# Patient Record
Sex: MALE | Race: WHITE | Employment: OTHER | URBAN - METROPOLITAN AREA
[De-identification: names, ages, dates, MRNs, and addresses within clinical notes are randomized per-mention and may not be internally consistent; named-entity substitution may affect disease eponyms.]

---

## 2017-12-07 NOTE — TELEPHONE ENCOUNTER
Date of last visit:  12/12/2016  Date of next visit:  Visit date not found    Requested Prescriptions     Pending Prescriptions Disp Refills    metoprolol tartrate (LOPRESSOR) 50 MG tablet [Pharmacy Med Name: METOPROLOL TARTRATE TABS 50MG] 135 tablet 1     Sig: TAKE ONE-HALF (1/2) TABLET THREE TIMES A DAY

## 2017-12-08 RX ORDER — METOPROLOL TARTRATE 50 MG/1
TABLET, FILM COATED ORAL
Qty: 135 TABLET | Refills: 0 | Status: SHIPPED | OUTPATIENT
Start: 2017-12-08 | End: 2018-06-13 | Stop reason: SDUPTHER

## 2018-05-05 LAB
CHOLESTEROL, TOTAL: NORMAL MG/DL
CHOLESTEROL/HDL RATIO: NORMAL
HDLC SERPL-MCNC: NORMAL MG/DL (ref 35–70)
LDL CHOLESTEROL CALCULATED: 123 MG/DL (ref 0–160)
TRIGL SERPL-MCNC: NORMAL MG/DL
VLDLC SERPL CALC-MCNC: NORMAL MG/DL

## 2018-06-05 RX ORDER — METOPROLOL TARTRATE 50 MG/1
50 TABLET, FILM COATED ORAL 2 TIMES DAILY
Qty: 135 TABLET | Refills: 0 | Status: CANCELLED | OUTPATIENT
Start: 2018-06-05

## 2018-06-13 ENCOUNTER — OFFICE VISIT (OUTPATIENT)
Dept: FAMILY MEDICINE CLINIC | Age: 73
End: 2018-06-13

## 2018-06-13 VITALS
BODY MASS INDEX: 23.66 KG/M2 | SYSTOLIC BLOOD PRESSURE: 126 MMHG | RESPIRATION RATE: 14 BRPM | WEIGHT: 184.4 LBS | DIASTOLIC BLOOD PRESSURE: 68 MMHG | HEART RATE: 60 BPM | HEIGHT: 74 IN

## 2018-06-13 DIAGNOSIS — H40.9 GLAUCOMA OF BOTH EYES, UNSPECIFIED GLAUCOMA TYPE: ICD-10-CM

## 2018-06-13 DIAGNOSIS — I25.10 ASCVD (ARTERIOSCLEROTIC CARDIOVASCULAR DISEASE): Primary | ICD-10-CM

## 2018-06-13 PROCEDURE — 99213 OFFICE O/P EST LOW 20 MIN: CPT | Performed by: FAMILY MEDICINE

## 2018-06-13 RX ORDER — METOPROLOL TARTRATE 50 MG/1
50 TABLET, FILM COATED ORAL 3 TIMES DAILY
Qty: 540 TABLET | Refills: 1 | Status: SHIPPED | OUTPATIENT
Start: 2018-06-13 | End: 2019-06-13 | Stop reason: SDUPTHER

## 2018-06-13 ASSESSMENT — PATIENT HEALTH QUESTIONNAIRE - PHQ9
SUM OF ALL RESPONSES TO PHQ QUESTIONS 1-9: 0
2. FEELING DOWN, DEPRESSED OR HOPELESS: 0
SUM OF ALL RESPONSES TO PHQ9 QUESTIONS 1 & 2: 0
1. LITTLE INTEREST OR PLEASURE IN DOING THINGS: 0

## 2018-06-13 ASSESSMENT — ENCOUNTER SYMPTOMS
CHEST TIGHTNESS: 0
EYES NEGATIVE: 1
SHORTNESS OF BREATH: 0
NAUSEA: 0
COUGH: 0
BLOOD IN STOOL: 0
VOMITING: 0
ABDOMINAL PAIN: 0
DIARRHEA: 0
CONSTIPATION: 0

## 2019-06-13 NOTE — TELEPHONE ENCOUNTER
Date of last visit:  6/13/2018  Date of next visit:  Visit date not found    Requested Prescriptions     Pending Prescriptions Disp Refills    metoprolol tartrate (LOPRESSOR) 50 MG tablet [Pharmacy Med Name: METOPROLOL TARTRATE TABS 50MG] 540 tablet 1     Sig: TAKE 1 TABLET THREE TIMES A DAY

## 2019-06-14 RX ORDER — METOPROLOL TARTRATE 50 MG/1
TABLET, FILM COATED ORAL
Qty: 90 TABLET | Refills: 0 | Status: SHIPPED | OUTPATIENT
Start: 2019-06-14

## 2019-06-17 NOTE — TELEPHONE ENCOUNTER
Wife called back and stated he didn't want to schedule anything right now. Asked her to make sure he knows that we cannot call anything else in for him since it has been over a year since his last appointment. She understands that and stated she has told him already.

## 2021-02-08 ENCOUNTER — HOSPITAL ENCOUNTER (OUTPATIENT)
Age: 76
Discharge: HOME | End: 2021-02-08
Payer: COMMERCIAL

## 2021-02-08 PROCEDURE — U0003 INFECTIOUS AGENT DETECTION BY NUCLEIC ACID (DNA OR RNA); SEVERE ACUTE RESPIRATORY SYNDROME CORONAVIRUS 2 (SARS-COV-2) (CORONAVIRUS DISEASE [COVID-19]), AMPLIFIED PROBE TECHNIQUE, MAKING USE OF HIGH THROUGHPUT TECHNOLOGIES AS DESCRIBED BY CMS-2020-01-R: HCPCS

## 2021-02-17 LAB
SARS-COV-2: NOT DETECTED
SOURCE: NORMAL

## 2022-09-16 ENCOUNTER — TELEPHONE (OUTPATIENT)
Dept: FAMILY MEDICINE CLINIC | Age: 77
End: 2022-09-16

## 2022-09-16 NOTE — TELEPHONE ENCOUNTER
----- Message from Shawn Keller MD sent at 9/16/2022  2:40 PM EDT -----  Please verify that urology is following the results of the CT they ordered.   Thanks

## 2023-11-08 ENCOUNTER — TELEPHONE (OUTPATIENT)
Dept: FAMILY MEDICINE CLINIC | Age: 78
End: 2023-11-08

## 2023-11-08 NOTE — TELEPHONE ENCOUNTER
I spoke with Dr Miguel Mccormack office and he is doing a Watchman procedure and will call Dr Sai Dyer on her cell phone when he is done.

## 2023-11-08 NOTE — TELEPHONE ENCOUNTER
[facility] [place] [facility] to ask doctor to address the EKG Rhythm strip's that were send over

## 2023-11-08 NOTE — TELEPHONE ENCOUNTER
Called and spoke with Ratna Montes- Dr Jenny Rueda has not called and spoke with Dr Sai Dyer. She stated he finished his Procedure and went right into Clinic Hours. He still has 2 patients he is seeing in Office. Dr Sai Dyer would like Cardiology to follow up with Patient. I explained to Ratna Montes we have not seen the Patient since 2018. We did not order any testing. The testing was ordered by Cardiology while he was admitted at [facility]. She stated he was supposed to follow up in 7-10 days with Dr Jenny Rueda and 7-10 days with Dr Sai Dyer. He has yet to call Cardiology and make an appt. She will leave a message with the Nurses in regards to patient as well an call him tomorrow 11-9-2023 to get him scheduled with Dr Jenny Rueda. Called Patient- informed he needed to see Cardiology and he stated \"I am not going to see Cardiology because I am doing fine and I do not want to see them. \" Asked about scheduling an appt with  Dr Sai Dyer and he stated \"No I am not making an appt with her either, I am doing fine\". I strongly encouraged Patient to follow up with Cardiology per Dr Marx Ransom recommendations due to Abnormal Testing and patient declined.

## 2023-11-08 NOTE — TELEPHONE ENCOUNTER
Spoke to patient to let him know about the rhythm irregularities on his EKG strip that are very concerning. He states that \" I am doing fine\" he states that he was under stress from coughing and he states that he really think it was the cough that triggered his problem. He states that he will try not to do that again. He states that he is in good health and he does not need to see anybody at this time. He states that he was seen by the eye doctor today and he was told that everything was okay. I told him that I thought he should follow-up with cardiology but he does not want to do that.

## 2025-01-16 ENCOUNTER — OFFICE VISIT (OUTPATIENT)
Dept: FAMILY MEDICINE CLINIC | Age: 80
End: 2025-01-16

## 2025-01-16 VITALS
RESPIRATION RATE: 12 BRPM | BODY MASS INDEX: 22.61 KG/M2 | WEIGHT: 176.2 LBS | HEART RATE: 72 BPM | SYSTOLIC BLOOD PRESSURE: 120 MMHG | DIASTOLIC BLOOD PRESSURE: 80 MMHG | HEIGHT: 74 IN

## 2025-01-16 DIAGNOSIS — I10 ESSENTIAL HYPERTENSION: ICD-10-CM

## 2025-01-16 DIAGNOSIS — K40.90 INGUINAL HERNIA, RIGHT: Primary | ICD-10-CM

## 2025-01-16 SDOH — ECONOMIC STABILITY: FOOD INSECURITY: WITHIN THE PAST 12 MONTHS, THE FOOD YOU BOUGHT JUST DIDN'T LAST AND YOU DIDN'T HAVE MONEY TO GET MORE.: NEVER TRUE

## 2025-01-16 SDOH — ECONOMIC STABILITY: FOOD INSECURITY: WITHIN THE PAST 12 MONTHS, YOU WORRIED THAT YOUR FOOD WOULD RUN OUT BEFORE YOU GOT MONEY TO BUY MORE.: NEVER TRUE

## 2025-01-16 ASSESSMENT — ENCOUNTER SYMPTOMS
SORE THROAT: 0
ABDOMINAL PAIN: 0
VOICE CHANGE: 0
DIARRHEA: 0
CHEST TIGHTNESS: 0
WHEEZING: 0
NAUSEA: 0
TROUBLE SWALLOWING: 0
COUGH: 0
RHINORRHEA: 0
SHORTNESS OF BREATH: 0
SINUS PRESSURE: 0
CONSTIPATION: 0
VOMITING: 0
BACK PAIN: 0

## 2025-01-16 ASSESSMENT — PATIENT HEALTH QUESTIONNAIRE - PHQ9
SUM OF ALL RESPONSES TO PHQ QUESTIONS 1-9: 0
SUM OF ALL RESPONSES TO PHQ9 QUESTIONS 1 & 2: 0
1. LITTLE INTEREST OR PLEASURE IN DOING THINGS: NOT AT ALL
2. FEELING DOWN, DEPRESSED OR HOPELESS: NOT AT ALL

## 2025-01-16 NOTE — PROGRESS NOTES
Routine     Referral Type:   Eval and Treat     Referral Reason:   Specialty Services Required     Referred to Provider:   Cyrus White MD     Requested Specialty:   General Surgery     Number of Visits Requested:   1       No results found for: \"LABA1C\"  Lab Results   Component Value Date    CREATININE 0.82 10/31/2023       Patient was given educational material on diabetes hypertension and hyperlipidemia    No follow-ups on file.     Discussed use, benefit, and side effects of prescribed medications, home medications were reviewed and updated today with the patient.  All patient questions answered.  Pt voiced understanding.  Instructedto continue current medications, diet and exercise.  Patient agreed with treatment plan.

## 2025-01-20 PROBLEM — E78.5 HYPERLIPIDEMIA: Status: ACTIVE | Noted: 2025-01-20

## 2025-01-20 PROBLEM — N20.0 CALCULUS OF KIDNEY: Status: ACTIVE | Noted: 2021-01-12

## 2025-01-20 PROBLEM — R00.0 TACHYCARDIA: Status: ACTIVE | Noted: 2025-01-20

## 2025-01-20 PROBLEM — Z95.1 HISTORY OF CORONARY ARTERY BYPASS SURGERY: Status: ACTIVE | Noted: 2025-01-20

## 2025-01-20 PROBLEM — M21.612 BUNION, LEFT: Status: ACTIVE | Noted: 2025-01-20

## 2025-01-22 ENCOUNTER — OFFICE VISIT (OUTPATIENT)
Dept: SURGERY | Age: 80
End: 2025-01-22
Payer: COMMERCIAL

## 2025-01-22 ENCOUNTER — TELEPHONE (OUTPATIENT)
Dept: SURGERY | Age: 80
End: 2025-01-22

## 2025-01-22 ENCOUNTER — PREP FOR PROCEDURE (OUTPATIENT)
Dept: SURGERY | Age: 80
End: 2025-01-22

## 2025-01-22 VITALS
BODY MASS INDEX: 22.84 KG/M2 | TEMPERATURE: 97.9 F | HEART RATE: 56 BPM | RESPIRATION RATE: 18 BRPM | SYSTOLIC BLOOD PRESSURE: 120 MMHG | DIASTOLIC BLOOD PRESSURE: 80 MMHG | WEIGHT: 178 LBS | OXYGEN SATURATION: 97 % | HEIGHT: 74 IN

## 2025-01-22 DIAGNOSIS — D17.6 LIPOMA, SPERMATIC CORD: ICD-10-CM

## 2025-01-22 DIAGNOSIS — I25.10 ASCVD (ARTERIOSCLEROTIC CARDIOVASCULAR DISEASE): ICD-10-CM

## 2025-01-22 DIAGNOSIS — I10 ESSENTIAL HYPERTENSION, BENIGN: ICD-10-CM

## 2025-01-22 DIAGNOSIS — Z95.1 HISTORY OF CORONARY ARTERY BYPASS SURGERY: Primary | ICD-10-CM

## 2025-01-22 DIAGNOSIS — K40.20 NON-RECURRENT BILATERAL INGUINAL HERNIA WITHOUT OBSTRUCTION OR GANGRENE: ICD-10-CM

## 2025-01-22 DIAGNOSIS — H40.9 GLAUCOMA OF BOTH EYES, UNSPECIFIED GLAUCOMA TYPE: ICD-10-CM

## 2025-01-22 PROBLEM — K40.90 RIGHT INGUINAL HERNIA: Status: ACTIVE | Noted: 2025-01-22

## 2025-01-22 PROCEDURE — 3074F SYST BP LT 130 MM HG: CPT | Performed by: SURGERY

## 2025-01-22 PROCEDURE — 3079F DIAST BP 80-89 MM HG: CPT | Performed by: SURGERY

## 2025-01-22 PROCEDURE — G8427 DOCREV CUR MEDS BY ELIG CLIN: HCPCS | Performed by: SURGERY

## 2025-01-22 PROCEDURE — 1123F ACP DISCUSS/DSCN MKR DOCD: CPT | Performed by: SURGERY

## 2025-01-22 PROCEDURE — G8420 CALC BMI NORM PARAMETERS: HCPCS | Performed by: SURGERY

## 2025-01-22 PROCEDURE — 1036F TOBACCO NON-USER: CPT | Performed by: SURGERY

## 2025-01-22 PROCEDURE — 99203 OFFICE O/P NEW LOW 30 MIN: CPT | Performed by: SURGERY

## 2025-01-22 ASSESSMENT — ENCOUNTER SYMPTOMS
SHORTNESS OF BREATH: 0
CONSTIPATION: 0
ABDOMINAL PAIN: 0
EYE ITCHING: 0
FACIAL SWELLING: 0
ABDOMINAL DISTENTION: 0
SORE THROAT: 0
COLOR CHANGE: 0
RHINORRHEA: 0
SINUS PAIN: 0
EYE DISCHARGE: 0
VOICE CHANGE: 0
COUGH: 0
APNEA: 0
SINUS PRESSURE: 0
TROUBLE SWALLOWING: 0
ANAL BLEEDING: 0
EYE PAIN: 0
PHOTOPHOBIA: 0
CHEST TIGHTNESS: 0
VOMITING: 0
RECTAL PAIN: 0
DIARRHEA: 0
EYE REDNESS: 0
BACK PAIN: 0
WHEEZING: 0
CHOKING: 0
NAUSEA: 0
STRIDOR: 0
BLOOD IN STOOL: 0

## 2025-01-22 NOTE — PROGRESS NOTES
Subjective   Patient ID: Kwabena Valerio is a 79 y.o. male.    HPI    Review of Systems   Constitutional:  Negative for activity change, appetite change, chills, diaphoresis, fatigue, fever and unexpected weight change.   HENT:  Negative for congestion, dental problem, drooling, ear discharge, ear pain, facial swelling, hearing loss, mouth sores, nosebleeds, postnasal drip, rhinorrhea, sinus pressure, sinus pain, sneezing, sore throat, tinnitus, trouble swallowing and voice change.    Eyes:  Negative for photophobia, pain, discharge, redness, itching and visual disturbance.   Respiratory:  Negative for apnea, cough, choking, chest tightness, shortness of breath, wheezing and stridor.    Cardiovascular:  Negative for chest pain, palpitations and leg swelling.   Gastrointestinal:  Negative for abdominal distention, abdominal pain, anal bleeding, blood in stool, constipation, diarrhea, nausea, rectal pain and vomiting.   Endocrine: Negative for cold intolerance, heat intolerance, polydipsia, polyphagia and polyuria.   Genitourinary:  Negative for decreased urine volume, difficulty urinating, dysuria, enuresis, flank pain, frequency, genital sores, hematuria, penile discharge, penile pain, penile swelling, scrotal swelling, testicular pain and urgency.   Musculoskeletal:  Negative for arthralgias, back pain, gait problem, joint swelling, myalgias, neck pain and neck stiffness.   Skin:  Negative for color change, pallor, rash and wound.   Allergic/Immunologic: Negative for environmental allergies, food allergies and immunocompromised state.   Neurological:  Negative for dizziness, tremors, seizures, syncope, facial asymmetry, speech difficulty, weakness, light-headedness, numbness and headaches.   Hematological:  Negative for adenopathy. Does not bruise/bleed easily.   Psychiatric/Behavioral:  Negative for agitation, behavioral problems, confusion, decreased concentration, dysphoric mood, hallucinations, self-injury, 
evaluation and opinion regarding diagnosed right inguinal hernia  Past Medical History  Past Medical History:   Diagnosis Date    Arthritis     ASCVD (arteriosclerotic cardiovascular disease)     CAD (coronary artery disease)     Glaucoma     Hyperlipidemia 1/20/2025    Hypertension        Past Surgical History  Past Surgical History:   Procedure Laterality Date    COLONOSCOPY W/ BIOPSIES  10/26/2021    Dr. Jacobsen    CORONARY ARTERY BYPASS GRAFT  2000    KNEE CARTILAGE SURGERY Bilateral     LEG SURGERY Right 09/2015    had alexandra placed - [facility]    TONSILLECTOMY AND ADENOIDECTOMY         Medications  Current Outpatient Medications on File Prior to Visit   Medication Sig Dispense Refill    Ascorbic Acid (VITAMIN C) 500 MG tablet Take 2 tablets by mouth daily      timolol (BETIMOL) 0.25 % ophthalmic solution Place 1 drop into both eyes daily       No current facility-administered medications on file prior to visit.     Allergies  Allergies   Allergen Reactions    Pcn [Penicillins]        Family History  Family History   Problem Relation Age of Onset    Other Mother 62        MVA    Other Father 68        MVA    Other Brother        Social History  Social History     Socioeconomic History    Marital status:      Spouse name: Not on file    Number of children: Not on file    Years of education: Not on file    Highest education level: Not on file   Occupational History    Occupation:      Employer: [employer]     Comment: retired   Tobacco Use    Smoking status: Never    Smokeless tobacco: Never   Substance and Sexual Activity    Alcohol use: No    Drug use: No    Sexual activity: Yes     Partners: Female   Other Topics Concern    Not on file   Social History Narrative    Not on file     Social Determinants of Health     Financial Resource Strain: Not on file   Food Insecurity: No Food Insecurity (1/16/2025)    Hunger Vital Sign     Worried About Running Out of Food in the Last Year: Never true     Ran Out

## 2025-01-22 NOTE — TELEPHONE ENCOUNTER
Per [employer]    Notification or Prior Authorization is not required for the requested services, [id number]    You are not required to submit a notification/prior authorization based on the information provided. If you have general questions about the prior authorization requirements, visit [employer] > Clinician Resources > Advance and Admission Notification Requirements. The number above acknowledges your notification. Please write this reference number down for future reference. If you would like to request an organization determination, please call us at [phone].     Decision ID #: [id number]

## 2025-01-23 LAB
HCT VFR BLD CALC: 41.1 % (ref 39–52)
HEMOGLOBIN: 13.9 G/DL (ref 13–18)

## 2025-02-02 NOTE — H&P
H and P for Surgery           [facility]      Cyrus White MD [facility]  General Surgery  New Patient Evaluation in Office  Pt Name: Kwabena Valerio  Date of Birth 1945   Today's Date: 2/2/2025  Medical Record Number: [id number]  Referring Provider: No ref. provider found  Primary Care Provider: Britt Santana MD  No chief complaint on file.    ASSESSMENT      Problem List Items Addressed This Visit    None      Active Hospital Problems    Diagnosis Date Noted    Bilateral inguinal hernia without obstruction or gangrene [K40.20] 01/22/2025        PLANS   Assessment & Plan   Schedule Kwabena for robotic assisted bilateral inguinal hernia repair with mesh possible Dr. White  In the office, I had a discussion with the patient regarding the risks of hernia surgery to include bleeding, infection, recurrence, injury to surrounding structures and continued pain in the area. We also discussed the use of mesh and its advantages and disadvantages.  We discussed the anesthetic options, conduct of the operation, outpatient status, post op recovery and length of time off of work. After this discussion, the patient's questions were answered and has elected to proceed with surgical repair. (OPEN)  In the office, I had a discussion with the patient regarding the risks of hernia surgery to include bleeding, infection, recurrence, injury to surrounding structures, continued pain in the area and possible conversion to an open procedure . We also discussed the use of mesh and its advantages and disadvantages.  We discussed the anesthetic options, conduct of the operation, outpatient status, post op recovery and length of time off of work. After this discussion, the patient's questions were answered and has elected to proceed with surgical repair. (L/S)  Status: outpatient  Planned anesthesia: GENERAL  He will undergo pre-operative clearance per anesthesia guidelines with risk factors listed under the past medical

## 2025-02-03 ENCOUNTER — ANESTHESIA (OUTPATIENT)
Dept: OPERATING ROOM | Age: 80
End: 2025-02-03
Payer: COMMERCIAL

## 2025-02-03 ENCOUNTER — ANESTHESIA EVENT (OUTPATIENT)
Dept: OPERATING ROOM | Age: 80
End: 2025-02-03
Payer: COMMERCIAL

## 2025-02-03 ENCOUNTER — HOSPITAL ENCOUNTER (OUTPATIENT)
Age: 80
Setting detail: OUTPATIENT SURGERY
Discharge: HOME | End: 2025-02-03
Attending: SURGERY | Admitting: SURGERY
Payer: COMMERCIAL

## 2025-02-03 VITALS
TEMPERATURE: 96.9 F | HEIGHT: 72 IN | BODY MASS INDEX: 23.24 KG/M2 | RESPIRATION RATE: 16 BRPM | DIASTOLIC BLOOD PRESSURE: 80 MMHG | SYSTOLIC BLOOD PRESSURE: 136 MMHG | OXYGEN SATURATION: 96 % | WEIGHT: 171.6 LBS | HEART RATE: 52 BPM

## 2025-02-03 DIAGNOSIS — Z98.890 S/P BILATERAL INGUINAL HERNIA REPAIR: Primary | ICD-10-CM

## 2025-02-03 DIAGNOSIS — Z87.19 S/P BILATERAL INGUINAL HERNIA REPAIR: Primary | ICD-10-CM

## 2025-02-03 PROCEDURE — 6360000002 HC RX W HCPCS: Performed by: ANESTHESIOLOGY

## 2025-02-03 PROCEDURE — 3700000001 HC ADD 15 MINUTES (ANESTHESIA): Performed by: SURGERY

## 2025-02-03 PROCEDURE — 7100000001 HC PACU RECOVERY - ADDTL 15 MIN: Performed by: SURGERY

## 2025-02-03 PROCEDURE — 6370000000 HC RX 637 (ALT 250 FOR IP): Performed by: SURGERY

## 2025-02-03 PROCEDURE — 2709999900 HC NON-CHARGEABLE SUPPLY: Performed by: SURGERY

## 2025-02-03 PROCEDURE — 7100000000 HC PACU RECOVERY - FIRST 15 MIN: Performed by: SURGERY

## 2025-02-03 PROCEDURE — 7100000011 HC PHASE II RECOVERY - ADDTL 15 MIN: Performed by: SURGERY

## 2025-02-03 PROCEDURE — 7100000010 HC PHASE II RECOVERY - FIRST 15 MIN: Performed by: SURGERY

## 2025-02-03 PROCEDURE — 6360000002 HC RX W HCPCS: Performed by: SURGERY

## 2025-02-03 PROCEDURE — C1781 MESH (IMPLANTABLE): HCPCS | Performed by: SURGERY

## 2025-02-03 PROCEDURE — 2500000003 HC RX 250 WO HCPCS: Performed by: SURGERY

## 2025-02-03 PROCEDURE — 49650 LAP ING HERNIA REPAIR INIT: CPT | Performed by: SURGERY

## 2025-02-03 PROCEDURE — 6360000002 HC RX W HCPCS: Performed by: NURSE ANESTHETIST, CERTIFIED REGISTERED

## 2025-02-03 PROCEDURE — 3600000019 HC SURGERY ROBOT ADDTL 15MIN: Performed by: SURGERY

## 2025-02-03 PROCEDURE — S2900 ROBOTIC SURGICAL SYSTEM: HCPCS | Performed by: SURGERY

## 2025-02-03 PROCEDURE — 3600000009 HC SURGERY ROBOT BASE: Performed by: SURGERY

## 2025-02-03 PROCEDURE — 2500000003 HC RX 250 WO HCPCS: Performed by: NURSE ANESTHETIST, CERTIFIED REGISTERED

## 2025-02-03 PROCEDURE — 3700000000 HC ANESTHESIA ATTENDED CARE: Performed by: SURGERY

## 2025-02-03 DEVICE — LAPAROSCOPIC SELF-FIXATING MESH POLYESTER WITH POLYLACTIC ACID GRIPS AND COLLAGEN FILM
Type: IMPLANTABLE DEVICE | Site: INGUINAL | Status: FUNCTIONAL
Brand: PROGRIP

## 2025-02-03 RX ORDER — ROCURONIUM BROMIDE 10 MG/ML
INJECTION, SOLUTION INTRAVENOUS
Status: DISCONTINUED | OUTPATIENT
Start: 2025-02-03 | End: 2025-02-03 | Stop reason: SDUPTHER

## 2025-02-03 RX ORDER — SODIUM CHLORIDE 0.9 % (FLUSH) 0.9 %
5-40 SYRINGE (ML) INJECTION EVERY 12 HOURS SCHEDULED
Status: DISCONTINUED | OUTPATIENT
Start: 2025-02-03 | End: 2025-02-03 | Stop reason: HOSPADM

## 2025-02-03 RX ORDER — DROPERIDOL 2.5 MG/ML
0.62 INJECTION, SOLUTION INTRAMUSCULAR; INTRAVENOUS ONCE
Status: COMPLETED | OUTPATIENT
Start: 2025-02-03 | End: 2025-02-03

## 2025-02-03 RX ORDER — SODIUM CHLORIDE 0.9 % (FLUSH) 0.9 %
5-40 SYRINGE (ML) INJECTION PRN
Status: DISCONTINUED | OUTPATIENT
Start: 2025-02-03 | End: 2025-02-03 | Stop reason: HOSPADM

## 2025-02-03 RX ORDER — FENTANYL CITRATE 50 UG/ML
INJECTION, SOLUTION INTRAMUSCULAR; INTRAVENOUS
Status: DISCONTINUED | OUTPATIENT
Start: 2025-02-03 | End: 2025-02-03 | Stop reason: SDUPTHER

## 2025-02-03 RX ORDER — MORPHINE SULFATE 2 MG/ML
2 INJECTION, SOLUTION INTRAMUSCULAR; INTRAVENOUS
Status: CANCELLED | OUTPATIENT
Start: 2025-02-03

## 2025-02-03 RX ORDER — IBUPROFEN 400 MG/1
400 TABLET, FILM COATED ORAL ONCE
Status: COMPLETED | OUTPATIENT
Start: 2025-02-03 | End: 2025-02-03

## 2025-02-03 RX ORDER — BUPIVACAINE HYDROCHLORIDE AND EPINEPHRINE 5; 5 MG/ML; UG/ML
INJECTION, SOLUTION EPIDURAL; INTRACAUDAL; PERINEURAL PRN
Status: DISCONTINUED | OUTPATIENT
Start: 2025-02-03 | End: 2025-02-03 | Stop reason: ALTCHOICE

## 2025-02-03 RX ORDER — ACETAMINOPHEN 500 MG
1000 TABLET ORAL ONCE
Status: COMPLETED | OUTPATIENT
Start: 2025-02-03 | End: 2025-02-03

## 2025-02-03 RX ORDER — ONDANSETRON 2 MG/ML
4 INJECTION INTRAMUSCULAR; INTRAVENOUS EVERY 6 HOURS PRN
Status: CANCELLED | OUTPATIENT
Start: 2025-02-03

## 2025-02-03 RX ORDER — ONDANSETRON 4 MG/1
4 TABLET, ORALLY DISINTEGRATING ORAL EVERY 8 HOURS PRN
Status: CANCELLED | OUTPATIENT
Start: 2025-02-03

## 2025-02-03 RX ORDER — SODIUM CHLORIDE 0.9 % (FLUSH) 0.9 %
5-40 SYRINGE (ML) INJECTION EVERY 12 HOURS SCHEDULED
Status: CANCELLED | OUTPATIENT
Start: 2025-02-03

## 2025-02-03 RX ORDER — SODIUM CHLORIDE 9 MG/ML
INJECTION, SOLUTION INTRAVENOUS CONTINUOUS
Status: DISCONTINUED | OUTPATIENT
Start: 2025-02-03 | End: 2025-02-03 | Stop reason: HOSPADM

## 2025-02-03 RX ORDER — SODIUM CHLORIDE 9 MG/ML
INJECTION, SOLUTION INTRAVENOUS PRN
Status: CANCELLED | OUTPATIENT
Start: 2025-02-03

## 2025-02-03 RX ORDER — SODIUM CHLORIDE 0.9 % (FLUSH) 0.9 %
5-40 SYRINGE (ML) INJECTION PRN
Status: CANCELLED | OUTPATIENT
Start: 2025-02-03

## 2025-02-03 RX ORDER — ONDANSETRON 2 MG/ML
INJECTION INTRAMUSCULAR; INTRAVENOUS
Status: DISCONTINUED | OUTPATIENT
Start: 2025-02-03 | End: 2025-02-03 | Stop reason: SDUPTHER

## 2025-02-03 RX ORDER — EPHEDRINE SULFATE/0.9% NACL/PF 25 MG/5 ML
SYRINGE (ML) INTRAVENOUS
Status: DISCONTINUED | OUTPATIENT
Start: 2025-02-03 | End: 2025-02-03 | Stop reason: SDUPTHER

## 2025-02-03 RX ORDER — SODIUM CHLORIDE 9 MG/ML
INJECTION, SOLUTION INTRAVENOUS PRN
Status: DISCONTINUED | OUTPATIENT
Start: 2025-02-03 | End: 2025-02-03 | Stop reason: HOSPADM

## 2025-02-03 RX ORDER — MORPHINE SULFATE 4 MG/ML
4 INJECTION, SOLUTION INTRAMUSCULAR; INTRAVENOUS
Status: CANCELLED | OUTPATIENT
Start: 2025-02-03

## 2025-02-03 RX ORDER — OXYCODONE HYDROCHLORIDE 5 MG/1
5 TABLET ORAL EVERY 6 HOURS PRN
Status: DISCONTINUED | OUTPATIENT
Start: 2025-02-03 | End: 2025-02-03 | Stop reason: HOSPADM

## 2025-02-03 RX ORDER — DEXAMETHASONE SODIUM PHOSPHATE 4 MG/ML
8 INJECTION, SOLUTION INTRA-ARTICULAR; INTRALESIONAL; INTRAMUSCULAR; INTRAVENOUS; SOFT TISSUE ONCE
Status: COMPLETED | OUTPATIENT
Start: 2025-02-03 | End: 2025-02-03

## 2025-02-03 RX ORDER — LIDOCAINE HYDROCHLORIDE 20 MG/ML
INJECTION, SOLUTION INFILTRATION; PERINEURAL
Status: DISCONTINUED | OUTPATIENT
Start: 2025-02-03 | End: 2025-02-03 | Stop reason: SDUPTHER

## 2025-02-03 RX ORDER — HYDROCODONE BITARTRATE AND ACETAMINOPHEN 5; 325 MG/1; MG/1
1 TABLET ORAL EVERY 6 HOURS PRN
Qty: 20 TABLET | Refills: 0 | Status: SHIPPED | OUTPATIENT
Start: 2025-02-03 | End: 2025-02-08

## 2025-02-03 RX ADMIN — IBUPROFEN 400 MG: 400 TABLET, FILM COATED ORAL at 07:17

## 2025-02-03 RX ADMIN — SUGAMMADEX 200 MG: 100 INJECTION, SOLUTION INTRAVENOUS at 09:11

## 2025-02-03 RX ADMIN — EPHEDRINE SULFATE 15 MG: 5 INJECTION INTRAVENOUS at 08:30

## 2025-02-03 RX ADMIN — ROCURONIUM BROMIDE 10 MG: 10 INJECTION INTRAVENOUS at 08:45

## 2025-02-03 RX ADMIN — FENTANYL CITRATE 50 MCG: 50 INJECTION, SOLUTION INTRAMUSCULAR; INTRAVENOUS at 07:47

## 2025-02-03 RX ADMIN — ROCURONIUM BROMIDE 20 MG: 10 INJECTION INTRAVENOUS at 08:31

## 2025-02-03 RX ADMIN — ONDANSETRON 4 MG: 2 INJECTION INTRAMUSCULAR; INTRAVENOUS at 08:16

## 2025-02-03 RX ADMIN — EPHEDRINE SULFATE 10 MG: 5 INJECTION INTRAVENOUS at 08:22

## 2025-02-03 RX ADMIN — WATER 2000 MG: 1 INJECTION INTRAMUSCULAR; INTRAVENOUS; SUBCUTANEOUS at 08:07

## 2025-02-03 RX ADMIN — ACETAMINOPHEN 1000 MG: 500 TABLET ORAL at 07:18

## 2025-02-03 RX ADMIN — LIDOCAINE HYDROCHLORIDE 100 MG: 20 INJECTION, SOLUTION INFILTRATION; PERINEURAL at 07:47

## 2025-02-03 RX ADMIN — FENTANYL CITRATE 50 MCG: 50 INJECTION, SOLUTION INTRAMUSCULAR; INTRAVENOUS at 09:09

## 2025-02-03 RX ADMIN — PROPOFOL 100 MG: 10 INJECTION, EMULSION INTRAVENOUS at 07:47

## 2025-02-03 RX ADMIN — DEXAMETHASONE SODIUM PHOSPHATE 8 MG: 4 INJECTION, SOLUTION INTRA-ARTICULAR; INTRALESIONAL; INTRAMUSCULAR; INTRAVENOUS; SOFT TISSUE at 07:18

## 2025-02-03 RX ADMIN — DROPERIDOL 0.62 MG: 2.5 INJECTION, SOLUTION INTRAMUSCULAR; INTRAVENOUS at 10:25

## 2025-02-03 RX ADMIN — ROCURONIUM BROMIDE 50 MG: 10 INJECTION INTRAVENOUS at 07:47

## 2025-02-03 ASSESSMENT — PAIN SCALES - WONG BAKER
WONGBAKER_NUMERICALRESPONSE: NO HURT
WONGBAKER_NUMERICALRESPONSE: NO HURT

## 2025-02-03 ASSESSMENT — PAIN - FUNCTIONAL ASSESSMENT
PAIN_FUNCTIONAL_ASSESSMENT: 0-10
PAIN_FUNCTIONAL_ASSESSMENT: WONG-BAKER FACES
PAIN_FUNCTIONAL_ASSESSMENT: ACTIVITIES ARE NOT PREVENTED

## 2025-02-03 NOTE — H&P
H and P for Surgery           [facility]  History and Physical Update    Pt Name: Kwabena Valerio  MRN: [id number]  YOB: 1945  Date of evaluation: 2/3/2025    [x] I have examined the patient and reviewed the H&P/Consult and there are no changes to the patient or plans.    [] I have examined the patient and reviewed the H&P/Consult and have noted the following changes:        Cyrus White MD  Electronically signed 2/3/2025 at 7:41 AM

## 2025-02-03 NOTE — PROGRESS NOTES
Patient oriented to Same Day department and admitted to Same Day Surgery room 8.   Patient verbalized approval for first name, last initial with physician name on unit whiteboard.     Plan of care reviewed with patient.   Patient room whiteboard filled out and discussed with patient and responsible adult.   Patient and responsible adult offered Same Day Welcome Packet to review.    Call light in reach.   Bed in lowest position, locked, with one bed rail up.   SCDs and warming blanket in place.  Appropriate arm bands on patient.   Bathroom offered.   All questions and concerns of patient addressed.        Meds to Beds:   Patient informed of [facility] Meds to [facility]eds program during admission. Patient has declined use of program.   Contact information for the pharmacy and the Meds to Beds program:   Name: theresa   Relationship to patient:spouse/significant other   Phone number: [phone]

## 2025-02-03 NOTE — OP NOTE
[facility]  Operative Report    PATIENT NAME: Kwabena Valerio  MEDICAL RECORD NO. [id number]  SURGEON: Cyrus White MD MD FACS  Primary Care Physician: Britt Santana MD  Date: 2/3/2025, 9:12 AM       PROCEDURE PERFORMED:  Robotic Assisted MANUEL Bilateral inguinal hernia repair 10cm x 15 cm progrip mesh   PREOPERATIVE DIAGNOSIS:   Active Hospital Problems    Diagnosis Date Noted    Bilateral inguinal hernia without obstruction or gangrene [K40.20] 01/22/2025       POSTOPERATIVE DIAGNOSIS: Same  SURGEON:  Cyrus White MD, FACS  ANESTHESIA:  General endotracheal anesthesia with local.  LOCAL ANESTHESIA: 0.5 % Marcaine   30 mls used  ESTIMATED BLOOD LOSS: 5 ml  SPECIMEN:  None  COMPLICATIONS:  None; patient tolerated the procedure well.  DRAINS: none  DISPOSITION: PACU   CONDITION: stable    Patient brought to the operating room, placed supine on the operating room table.  Monitoring placed by anesthesia then general endotracheal anesthesia was administered by anesthesia.  Timeout was taken, procedure confirmed.  Abdomen was clipped prepped and draped sterilely with ChloraPrep treatments allowed to pass.  At this point it was draped sterilely.    A site was chosen in the midline residential between the umbilicus and xiphoid process in a vertical 8 mm incision was made and using a 5 mm Optiview port and a 5 mm scope the abdominal cavity was accessed under direct vision watching all layers.  At this point the abdomen is insufflated to a pressure of 14 and the area below insertion was examined and showed no evidence of injury.  Peritoneal surfaces were glistening no other intra-abdominal pathology was noted.  The patient was then placed in 15 degrees headdown.    2 8 mm robotic ports were placed at the same line as the camera port one in the right midclavicular line even with the anterior superior iliac spine on the right in the similar location on the left.  The 5 mm Optiview port was then

## 2025-02-03 NOTE — PROGRESS NOTES
0918 Patient arrived to PACU. Patient unresponsive post anesthesia with OPA and simple mask in place. Abdominal incisions x3 CDI with no drainage present. Ice pack applied. Respirations even and unlabored. VSS.    0925 Patient arouses to voice. OPA and simple mask removed at this time. Patient encouraged to cough and take deep breaths. Patient denies pain and follows commands. Respirations even and unlabored. VSS.    0935 Patient resting in bed with eyes closed, but arouses to voice. Patient denies pain and nausea. Respirations even and unlabored. VSS.    0948 Patient meets criteria to discharge from PACU at this time. Patient transported to [facility] in stable condition with all patient belongings.

## 2025-02-03 NOTE — PROGRESS NOTES
Pt returned to [facility] room 8. Vitals and assessment as charted. 0.9 infusing, @400ml to count from PACU. Pt has crackers and water. Family at the bedside. Pt and family verbalized understanding of discharge criteria and call light use. Call light in reach.

## 2025-02-03 NOTE — DISCHARGE INSTRUCTIONS
[facility]      DR. MAYERS'S DISCHARGE INSTRUCTIONS    Pt Name: Kwabena Valerio  Medical Record Number: [id number]  Today's Date: 2/3/2025           GENERAL ANESTHESIA OR SEDATION:    [x]  Do not drive or operate hazardous machinery for 24 hours.  [x] Do not make important business or personal decisions for 24 hours.  [x] Do not drink alcoholic beverages or use tobacco for 24 hours.           ACTIVITY INSTRUCTIONS:    [] Rest today. Resume light to normal activity tomorrow.   [x] You may resume normal activity tomorrow. Do not engage in strenuous activity that may place stress on your incision.  [x] Do not drive  UNTIL NO LONGER TAKING NARCOTICS AND DAILY ACTIVITIES  ARE NEARLY NORMAL     [x]Avoid heavy lifting, tugging, pullings greater  than  25 lbs until seen in the office.               DIET INSTRUCTIONS:    []Begin with clear liquids. If not nauseated, may increase to a low-fat diet when you desire. Greasy and spicy foods are not advised.  [x]Regular diet as tolerated.  []Other:          MEDICATIONS  [x]Prescription sent with you to be used as directed.   []Lortab   [x]Norco   []Percocet   []Tylenol #3   []NONE              []Antibiotic              []Tramadol       Do not drink alcohol or drive while taking these medications.   You may experience dizziness or drowsiness with these medications.   You may also experience constipation which can be relieved with stool softners or laxatives.    [x]You may resume your daily prescription medication schedule unless otherwise specified.  []Do not take 325mg Aspirin or other blood thinners such as Coumadin or Plavix for 5 days.            WOUND/DRESSING INSTRUCTIONS:    Always ensure you and your care giver clean hands before and after caring for the wound.    [] Keep dressing clean and dry for 24 hours.       [] Allow steri-strips to fall off on their own.   [] Ice operative site for 20 minutes 4 times a day.     [x] May wash over incision in shower,

## 2025-02-03 NOTE — ANESTHESIA POSTPROCEDURE EVALUATION
Department of Anesthesiology  Postprocedure Note    Patient: Kwabena Valerio  MRN: [id number]  YOB: 1945  Date of evaluation: 2/3/2025    Procedure Summary       Date: 02/03/25 [facility]    Anesthesia Start: 0744 Anesthesia Stop: 0924    Procedure: Robotic assisted bilateral inguinal hernia repair with mesh possible open (Bilateral: Abdomen) Diagnosis:       Bilateral inguinal hernia without obstruction or gangrene      (Bilateral inguinal hernia without obstruction or gangrene [K40.20])    Surgeons: Cyrus White MD Responsible Provider: Efrem Gutierrez MD    Anesthesia Type: General ASA Status: 3            Anesthesia Type: General    Fernandez Phase I: Fernandez Score: 9    Fernandez Phase II: Fernandez Score: 10    Anesthesia Post Evaluation    Patient location during evaluation: PACU  Patient participation: complete - patient participated  Level of consciousness: awake and alert  Airway patency: patent  Nausea & Vomiting: no nausea and no vomiting  Cardiovascular status: hemodynamically stable  Respiratory status: acceptable  Hydration status: euvolemic  Pain management: adequate    [facility]  POST-ANESTHESIA NOTE       Name:  Kwabena Valerio                                         Age:  79 y.o.  MRN:  [id number]      Last Vitals:  BP (!) 151/75   Pulse 50   Temp 96.9 °F (36.1 °C) (Temporal)   Resp 16   Ht 1.829 m (6')   Wt 77.8 kg (171 lb 9.6 oz)   SpO2 99%   BMI 23.27 kg/m²   Patient Vitals for the past 4 hrs:   BP Temp Temp src Pulse Resp SpO2 Height Weight   02/03/25 0953 (!) 151/75 96.9 °F (36.1 °C) Temporal 50 16 99 % -- --   02/03/25 0945 (!) 148/76 -- -- 55 14 99 % -- --   02/03/25 0940 136/87 -- -- 59 14 100 % -- --   02/03/25 0935 138/75 97.2 °F (36.2 °C) Temporal 54 16 98 % -- --   02/03/25 0930 (!) 152/75 -- -- 54 18 99 % -- --   02/03/25 0925 (!) 149/72 -- -- 55 16 100 % -- --   02/03/25 0920 (!) 144/71 -- -- 56 17 100 % -- --   02/03/25

## 2025-02-03 NOTE — ANESTHESIA PRE PROCEDURE
Department of Anesthesiology  Preprocedure Note       Name:  Kwabena Valerio   Age:  79 y.o.  :  1945                                          MRN:  [id number]         Date:  2/3/2025      Surgeon: Surgeon(s):  Cyrus White MD    Procedure: Procedure(s):  Robotic assisted bilateral inguinal hernia repair with mesh possible open    Medications prior to admission:   Prior to Admission medications    Medication Sig Start Date End Date Taking? Authorizing Provider   Ascorbic Acid (VITAMIN C) 500 MG tablet Take 2 tablets by mouth daily   Yes ProviderClaudia MD   timolol (BETIMOL) 0.25 % ophthalmic solution Place 1 drop into both eyes daily   Yes Provider, MD Claudia       Current medications:    Current Facility-Administered Medications   Medication Dose Route Frequency Provider Last Rate Last Admin    acetaminophen (TYLENOL) tablet 1,000 mg  1,000 mg Oral Once Cyrus White MD        dexAMETHasone (DECADRON) injection 8 mg  8 mg IntraVENous Once Cyrus White MD        0.9 % sodium chloride infusion   IntraVENous Continuous Cyrus White MD        sodium chloride flush 0.9 % injection 5-40 mL  5-40 mL IntraVENous 2 times per day Cyrus White MD        sodium chloride flush 0.9 % injection 5-40 mL  5-40 mL IntraVENous PRN Cyrus White MD        0.9 % sodium chloride infusion   IntraVENous PRN Cyrus White MD        ceFAZolin (ANCEF) 2,000 mg in sterile water 20 mL IV syringe  2,000 mg IntraVENous On Call to OR Cyrus White MD        ibuprofen (ADVIL;MOTRIN) tablet 400 mg  400 mg Oral Once Cyrus White MD           Allergies:    Allergies   Allergen Reactions    Pcn [Penicillins]        Problem List:    Patient Active Problem List   Diagnosis Code    Glaucoma H40.9    ASCVD (arteriosclerotic cardiovascular disease) I25.10    Bunion, left M21.612    Calculus of kidney N20.0    Hyperlipidemia E78.5    History of coronary artery bypass surgery

## 2025-02-04 ENCOUNTER — TELEPHONE (OUTPATIENT)
Dept: SURGERY | Age: 80
End: 2025-02-04

## 2025-02-04 NOTE — TELEPHONE ENCOUNTER
Post procedural phone call returned by patient. Patient utilizing prescribed medication with adequate control of pain. Patient education against constipation, recommended increased fluid intake, progressive ambulation and stool softeners/stimulants as directed. Patient able to readback education. Patients follow up appointment verified and confirmed in chart. Time spent for patient questions. Patient to follow up with office as needed.

## 2025-02-11 ENCOUNTER — TELEPHONE (OUTPATIENT)
Dept: SURGERY | Age: 80
End: 2025-02-11

## 2025-02-11 ENCOUNTER — OFFICE VISIT (OUTPATIENT)
Dept: SURGERY | Age: 80
End: 2025-02-11

## 2025-02-11 VITALS
DIASTOLIC BLOOD PRESSURE: 84 MMHG | TEMPERATURE: 97.5 F | HEART RATE: 89 BPM | SYSTOLIC BLOOD PRESSURE: 136 MMHG | WEIGHT: 174 LBS | BODY MASS INDEX: 23.57 KG/M2 | OXYGEN SATURATION: 95 % | HEIGHT: 72 IN | RESPIRATION RATE: 18 BRPM

## 2025-02-11 DIAGNOSIS — T78.40XA ALLERGIC REACTION, INITIAL ENCOUNTER: Primary | ICD-10-CM

## 2025-02-11 PROCEDURE — 99024 POSTOP FOLLOW-UP VISIT: CPT | Performed by: SURGERY

## 2025-02-11 RX ORDER — DIPHENHYDRAMINE HCL 25 MG
25 CAPSULE ORAL EVERY 6 HOURS PRN
Status: SHIPPED | OUTPATIENT
Start: 2025-02-11

## 2025-02-11 RX ORDER — METHYLPREDNISOLONE 4 MG/1
TABLET ORAL
Qty: 1 KIT | Refills: 0 | Status: SHIPPED | OUTPATIENT
Start: 2025-02-11 | End: 2025-02-17

## 2025-02-11 NOTE — TELEPHONE ENCOUNTER
Patient called to office with concerns regarding painful rash and reaction around the trunk, incisions irritated and fluid collection in groin. Patient endorses significant pain and tenderness all along his abdomen, patient concerned he is rejecting his mesh. Symptom onset began 2/7/25 and has progressively gotten worse. Patient requesting to be seen as soon as possible in relation to these symptoms. Patient scheduled with Dr. Webster due to Dr. White being unavailable.

## 2025-02-11 NOTE — PROGRESS NOTES
Claire Webster MD  [facility]  General Surgery  Clinic  Note    Pt Name: Kwabena Valerio  Medical Record Number: [id number]  Date of Birth 1945   Today's Date: 02/11/2025      ASSESSMENT/ PLAN:     Assessment & Plan  1. Allergic reaction to surgical preparation.  The rash appears to be a manifestation of an allergic reaction to the surgical preparation used during his recent procedure. The observed bruising is a common post-surgical occurrence, while the swelling is an abnormal response likely due to the allergic reaction. His constipation is a known side effect of analgesic medications. A prescription for a Solu-Medrol Dosepak has been issued to manage the allergic reaction. He has been advised to continue with the stool softener regimen. Over-the-counter Benadryl 25 mg or 50 mg has been recommended for use as needed to alleviate itching. Additionally, he may consider taking a calamine or oatmeal bath to soothe the skin. If there is no improvement in his condition by the end of the week, he should inform us immediately.         SUBJECTIVE     History of Present Illness  The patient presents for evaluation of a severe reaction to surgical prep.    He reports experiencing a significant adverse reaction to the surgical preparation used during his procedure, which was performed a week ago on Monday. He describes the presence of fluid accumulation under the skin of his penis, accompanied by swelling and redness. The onset of these symptoms was noted on Saturday morning, following a period of normal recovery post-surgery. Despite maintaining his regular exercise routine, including walking a mile, he observed a progressive worsening of his condition from Saturday through Monday. He has not attempted any steroid treatments. He expresses concern about the possibility of the mesh used in his surgery being the cause of his symptoms. He has been supplementing with vitamin C, increasing his intake from 10 g to

## 2025-02-14 DIAGNOSIS — H40.9 GLAUCOMA OF BOTH EYES, UNSPECIFIED GLAUCOMA TYPE: ICD-10-CM

## 2025-02-14 DIAGNOSIS — I25.10 ASCVD (ARTERIOSCLEROTIC CARDIOVASCULAR DISEASE): ICD-10-CM

## 2025-02-14 DIAGNOSIS — I10 ESSENTIAL HYPERTENSION, BENIGN: ICD-10-CM

## 2025-02-14 DIAGNOSIS — Z95.1 HISTORY OF CORONARY ARTERY BYPASS SURGERY: ICD-10-CM

## 2025-02-18 PROBLEM — T78.40XA ALLERGIC REACTION TO SUBSTANCE: Status: ACTIVE | Noted: 2025-02-11

## 2025-02-19 ENCOUNTER — OFFICE VISIT (OUTPATIENT)
Dept: SURGERY | Age: 80
End: 2025-02-19

## 2025-02-19 VITALS
SYSTOLIC BLOOD PRESSURE: 126 MMHG | DIASTOLIC BLOOD PRESSURE: 74 MMHG | BODY MASS INDEX: 23.98 KG/M2 | TEMPERATURE: 97.4 F | HEIGHT: 72 IN | WEIGHT: 177 LBS

## 2025-02-19 DIAGNOSIS — T78.40XA ALLERGIC REACTION TO SUBSTANCE: ICD-10-CM

## 2025-02-19 DIAGNOSIS — Z87.19 S/P BILATERAL INGUINAL HERNIA REPAIR: Primary | ICD-10-CM

## 2025-02-19 DIAGNOSIS — Z98.890 S/P BILATERAL INGUINAL HERNIA REPAIR: Primary | ICD-10-CM

## 2025-02-19 PROCEDURE — 99024 POSTOP FOLLOW-UP VISIT: CPT | Performed by: SURGERY

## 2025-06-27 ENCOUNTER — OFFICE VISIT (OUTPATIENT)
Dept: FAMILY MEDICINE CLINIC | Age: 80
End: 2025-06-27

## 2025-06-27 VITALS
DIASTOLIC BLOOD PRESSURE: 84 MMHG | RESPIRATION RATE: 12 BRPM | WEIGHT: 173.6 LBS | HEART RATE: 56 BPM | HEIGHT: 72 IN | SYSTOLIC BLOOD PRESSURE: 136 MMHG | BODY MASS INDEX: 23.51 KG/M2

## 2025-06-27 DIAGNOSIS — I48.0 PAROXYSMAL ATRIAL FIBRILLATION (HCC): Primary | ICD-10-CM

## 2025-06-27 RX ORDER — METOPROLOL SUCCINATE 25 MG/1
12.5 TABLET, EXTENDED RELEASE ORAL DAILY
Qty: 90 TABLET | Refills: 1 | Status: SHIPPED | OUTPATIENT
Start: 2025-06-27

## 2025-06-27 RX ORDER — ASPIRIN 81 MG/1
81 TABLET ORAL DAILY
Qty: 90 TABLET | Refills: 1 | Status: SHIPPED | OUTPATIENT
Start: 2025-06-27

## 2025-06-27 RX ORDER — LATANOPROST 50 UG/ML
1 SOLUTION/ DROPS OPHTHALMIC NIGHTLY
COMMUNITY

## 2025-06-27 ASSESSMENT — ENCOUNTER SYMPTOMS
SHORTNESS OF BREATH: 0
SINUS PRESSURE: 0
SORE THROAT: 0
BACK PAIN: 0
WHEEZING: 0
ABDOMINAL PAIN: 0
TROUBLE SWALLOWING: 0
CONSTIPATION: 0
DIARRHEA: 0
NAUSEA: 0
VOICE CHANGE: 0
RHINORRHEA: 0
COUGH: 0
VOMITING: 0
CHEST TIGHTNESS: 0

## 2025-06-27 NOTE — PROGRESS NOTES
orders of the defined types were placed in this encounter.      Return in about 3 months (around 9/27/2025).     Discussed use, benefit, and side effects of prescribedmedications. Current home medications reviewed and updated with the patient. All patient questions answered.  Pt voiced understanding.  Instructedto continue current medications, diet and exercise.  Patient agreed with treatment plan.

## (undated) DEVICE — SUTURE VICRYL + SZ 2-0 L27IN ABSRB WHT SH 1/2 CIR TAPERCUT VCP417H

## (undated) DEVICE — ELECTRO LUBE IS A SINGLE PATIENT USE DEVICE THAT IS INTENDED TO BE USED ON ELECTROSURGICAL ELECTRODES TO REDUCE STICKING.: Brand: KEY SURGICAL ELECTRO LUBE

## (undated) DEVICE — TROCAR: Brand: KII FIOS FIRST ENTRY

## (undated) DEVICE — SYRINGE MED 30ML STD CLR PLAS LUERLOCK TIP N CTRL DISP

## (undated) DEVICE — TUBING INSUFFLATOR HEAT HUMIDIFIED SMK EVAC SET PNEUMOCLEAR

## (undated) DEVICE — TIP COVER ACCESSORY

## (undated) DEVICE — BLADE,CARBON-STEEL,15,STRL,DISPOSABLE,TB: Brand: MEDLINE

## (undated) DEVICE — SEAL

## (undated) DEVICE — GENERAL LAPAROSCOPY-LF: Brand: MEDLINE INDUSTRIES, INC.

## (undated) DEVICE — COLUMN DRAPE

## (undated) DEVICE — LIQUIBAND RAPID ADHESIVE 36/CS 0.8ML: Brand: MEDLINE

## (undated) DEVICE — ARM DRAPE

## (undated) DEVICE — SUTURE V-LOC 180 SZ 2-0 L9IN ABSRB GRN L27MM GS-22 1/2 CIR VLOCL2145

## (undated) DEVICE — GLOVE ORANGE PI 7 1/2   MSG9075

## (undated) DEVICE — 40586 ADVANCED TRENDELENBURG POSITIONING KIT: Brand: 40586 ADVANCED TRENDELENBURG POSITIONING KIT

## (undated) DEVICE — APPLICATOR MEDICATED 26 CC SOLUTION HI LT ORNG CHLORAPREP

## (undated) DEVICE — SPONGE,NEURO,1"X3",XR,STRL,LF,10/PK: Brand: MEDLINE